# Patient Record
Sex: MALE | Race: WHITE | NOT HISPANIC OR LATINO | Employment: UNEMPLOYED | URBAN - METROPOLITAN AREA
[De-identification: names, ages, dates, MRNs, and addresses within clinical notes are randomized per-mention and may not be internally consistent; named-entity substitution may affect disease eponyms.]

---

## 2023-07-18 ENCOUNTER — HOSPITAL ENCOUNTER (EMERGENCY)
Facility: HOSPITAL | Age: 43
Discharge: HOME | End: 2023-07-18
Attending: EMERGENCY MEDICINE
Payer: MEDICAID

## 2023-07-18 VITALS
SYSTOLIC BLOOD PRESSURE: 143 MMHG | DIASTOLIC BLOOD PRESSURE: 79 MMHG | TEMPERATURE: 99 F | WEIGHT: 165.81 LBS | RESPIRATION RATE: 18 BRPM | HEART RATE: 66 BPM | OXYGEN SATURATION: 98 %

## 2023-07-18 DIAGNOSIS — S61.411A LACERATION OF RIGHT HAND WITHOUT FOREIGN BODY, INITIAL ENCOUNTER: Primary | ICD-10-CM

## 2023-07-18 PROCEDURE — 25000003 PHARM REV CODE 250: Mod: ER | Performed by: NURSE PRACTITIONER

## 2023-07-18 PROCEDURE — 99284 EMERGENCY DEPT VISIT MOD MDM: CPT | Mod: 25,ER

## 2023-07-18 PROCEDURE — 90471 IMMUNIZATION ADMIN: CPT | Mod: ER | Performed by: NURSE PRACTITIONER

## 2023-07-18 PROCEDURE — 12002 RPR S/N/AX/GEN/TRNK2.6-7.5CM: CPT | Mod: ER

## 2023-07-18 PROCEDURE — 63600175 PHARM REV CODE 636 W HCPCS: Mod: ER | Performed by: NURSE PRACTITIONER

## 2023-07-18 PROCEDURE — 90715 TDAP VACCINE 7 YRS/> IM: CPT | Mod: ER | Performed by: NURSE PRACTITIONER

## 2023-07-18 RX ORDER — LIDOCAINE HYDROCHLORIDE 10 MG/ML
10 INJECTION, SOLUTION EPIDURAL; INFILTRATION; INTRACAUDAL; PERINEURAL
Status: COMPLETED | OUTPATIENT
Start: 2023-07-18 | End: 2023-07-18

## 2023-07-18 RX ORDER — IBUPROFEN 600 MG/1
600 TABLET ORAL EVERY 6 HOURS PRN
Qty: 20 TABLET | Refills: 0 | Status: SHIPPED | OUTPATIENT
Start: 2023-07-18 | End: 2023-07-23

## 2023-07-18 RX ORDER — IBUPROFEN 200 MG
600 TABLET ORAL
Status: COMPLETED | OUTPATIENT
Start: 2023-07-18 | End: 2023-07-18

## 2023-07-18 RX ADMIN — IBUPROFEN 600 MG: 200 TABLET, FILM COATED ORAL at 03:07

## 2023-07-18 RX ADMIN — BACITRACIN ZINC, NEOMYCIN, POLYMYXIN B 1 EACH: 400; 3.5; 5 OINTMENT TOPICAL at 02:07

## 2023-07-18 RX ADMIN — LIDOCAINE HYDROCHLORIDE 100 MG: 10 INJECTION, SOLUTION EPIDURAL; INFILTRATION; INTRACAUDAL at 02:07

## 2023-07-18 RX ADMIN — TETANUS TOXOID, REDUCED DIPHTHERIA TOXOID AND ACELLULAR PERTUSSIS VACCINE, ADSORBED 0.5 ML: 5; 2.5; 8; 8; 2.5 SUSPENSION INTRAMUSCULAR at 02:07

## 2023-07-18 NOTE — ED PROVIDER NOTES
Encounter Date: 7/18/2023       History     Chief Complaint   Patient presents with    Laceration     Right palm from car rim      The history is provided by the patient.   Laceration   The incident occurred just prior to arrival. The laceration is located on the Right hand. The laceration is 3 cm in size. The laceration mechanism was a a metal edge (Rim of a tire.). The pain has been Constant since onset. It is unknown if a foreign body is present. His tetanus status is unknown.   Review of patient's allergies indicates:   Allergen Reactions    Penicillins Anaphylaxis     History reviewed. No pertinent past medical history.  History reviewed. No pertinent surgical history.  History reviewed. No pertinent family history.     Review of Systems   Constitutional:  Negative for chills, fatigue and fever.   Respiratory:  Negative for cough and shortness of breath.    Cardiovascular:  Negative for chest pain.   Gastrointestinal:  Negative for abdominal pain, nausea and vomiting.   Musculoskeletal:  Negative for back pain, myalgias and neck pain.        +right hand pain   Skin:  Positive for wound. Negative for rash.   Neurological:  Negative for weakness and numbness.     Physical Exam     Initial Vitals [07/18/23 1403]   BP Pulse Resp Temp SpO2   (!) 143/79 66 18 98.8 °F (37.1 °C) 98 %      MAP       --         Physical Exam    Nursing note and vitals reviewed.  Constitutional: He appears well-developed and well-nourished. He is not diaphoretic. No distress.   HENT:   Head: Normocephalic and atraumatic.   Eyes: EOM are normal.   Neck: Neck supple.   Normal range of motion.  Cardiovascular:  Normal rate, regular rhythm and intact distal pulses.           Pulmonary/Chest: No respiratory distress.   Musculoskeletal:         General: Normal range of motion.      Right wrist: Normal.      Left wrist: Normal.      Right hand: Laceration (+3cm laceration to palmar surface of right hand.  Patient has full active range of motion  to all fingers of right hand as well as against resistance.  No active bleeding.  No palpable or visible foreign body.) and tenderness present. No swelling or deformity. Normal range of motion. Normal strength. Normal sensation. Normal capillary refill. Normal pulse.      Left hand: Normal.      Cervical back: Normal range of motion and neck supple.     Neurological: He is alert and oriented to person, place, and time. He has normal strength. No sensory deficit. GCS score is 15. GCS eye subscore is 4. GCS verbal subscore is 5. GCS motor subscore is 6.   Skin: Skin is warm and dry. Capillary refill takes less than 2 seconds.       ED Course   Lac Repair    Date/Time: 7/18/2023 4:06 PM  Performed by: Lenin Pollard NP  Authorized by: Erna Mar DO     Consent:     Consent obtained:  Verbal    Consent given by:  Patient    Risks, benefits, and alternatives were discussed: yes      Risks discussed:  Infection, pain, poor cosmetic result and poor wound healing    Alternatives discussed:  No treatment  Anesthesia:     Anesthesia method:  Local infiltration    Local anesthetic:  Lidocaine 1% w/o epi  Laceration details:     Location:  Hand    Hand location:  R palm    Length (cm):  3  Pre-procedure details:     Preparation:  Patient was prepped and draped in usual sterile fashion and imaging obtained to evaluate for foreign bodies  Exploration:     Imaging obtained: x-ray      Imaging outcome: foreign body not noted      Wound exploration: wound explored through full range of motion      Wound extent: no foreign bodies/material noted and no underlying fracture noted      Contaminated: no    Treatment:     Area cleansed with:  Povidone-iodine, chlorhexidine and saline    Amount of cleaning:  Standard    Irrigation solution:  Sterile saline  Skin repair:     Repair method:  Sutures    Suture size:  4-0    Suture material:  Nylon    Suture technique:  Simple interrupted    Number of sutures:  5  Approximation:      Approximation:  Close  Repair type:     Repair type:  Simple  Post-procedure details:     Dressing:  Antibiotic ointment and non-adherent dressing    Procedure completion:  Tolerated well, no immediate complications  Labs Reviewed - No data to display       Imaging Results              X-Ray Hand 3 view Right (Final result)  Result time 07/18/23 14:37:36      Final result by Noel Gibbs MD (07/18/23 14:37:36)                   Impression:      No fracture or foreign body identified.      Electronically signed by: Noel Gibbs  Date:    07/18/2023  Time:    14:37               Narrative:    EXAMINATION:  XR HAND COMPLETE 3 VIEW RIGHT    CLINICAL HISTORY:  right hand injury; laceration;    TECHNIQUE:  Standard radiography performed.    COMPARISON:  None    FINDINGS:  No fracture or dislocation.  Arthritic change 1st metacarpal phalangeal joint.  No foreign bodies are identified.  Tiny calcification adjacent to the 1st MCP joint.                                       Medications   LIDOcaine (PF) 10 mg/ml (1%) injection 100 mg (100 mg Infiltration Given 7/18/23 1417)   Tdap (BOOSTRIX) vaccine injection 0.5 mL (0.5 mLs Intramuscular Given 7/18/23 1417)   neomycin-bacitracnZn-polymyxnB packet 1 each (1 each Topical (Top) Given 7/18/23 1417)   ibuprofen tablet 600 mg (600 mg Oral Given 7/18/23 1551)                  Regarding LACERATION CARE, patient was instructed to wash hands with soap and warm water before and after caring for wound; keep the wound dry for the first 24 to 48 hours and then gently clean the wound once or twice a day with cool water using soap to clean around the wound; avoid using alcohol or hydrogen peroxide to clean wound unless directed to; and use bandages to keep wound clean and protected and to prevent swelling.  Advised patient to contact primary healthcare provider if wound splits open; becomes extremely painful; appears to not be healing; has a foreign object present; develop a purulent  discharge; or note the skin around the wound becoming numb, edematous, or erythematous.  Patient instructed to follow up with primary care provider for closure removal in 10 days.  I discussed wound care precautions with patient; specifically that all wounds have risk of infection despite efforts to cleanse and debride the wound; and there is a risk of an occult foreign body (and thus increased risk of infection) despite a negative examination.  I discussed with patient need to return for any signs of infection, specifically redness, increased pain, fever, drainage of pus, or any concern, immediately.       I discussed with patient  that evaluation in the ED does not suggest any emergent or life threatening medical conditions requiring immediate intervention beyond what was provided in the ED, and I believe patient is safe for discharge. Regardless, an unremarkable evaluation in the ED does not preclude the development or presence of a serious of life threatening condition. As such, patient was instructed to return immediately for any worsening or change in current symptoms.           Clinical Impression:   Final diagnoses:  [S64.411Y] Laceration of right hand without foreign body, initial encounter (Primary)        ED Disposition Condition    Discharge Stable          ED Prescriptions       Medication Sig Dispense Start Date End Date Auth. Provider    neomycin-polymyxin-pramoxine (NEOSPORIN) 3.5-10,000-10 mg-unit-mg/gram Crea Apply topically 2 (two) times daily. for 10 days 1 each 7/18/2023 7/28/2023 Lenin Pollard NP    ibuprofen (ADVIL,MOTRIN) 600 MG tablet Take 1 tablet (600 mg total) by mouth every 6 (six) hours as needed for Pain. 20 tablet 7/18/2023 7/23/2023 Lenin Pollard NP          Follow-up Information       Follow up With Specialties Details Why Contact Info    Follow-up with your PCP for suture removal in 10 days.        [facility] Emergency Medicine  As needed, If symptoms worsen  [id number] [facility] [address]  [facility] [id number] [phone]             Lenin Pollard, KANWAL  07/18/23 1830